# Patient Record
Sex: MALE | Race: WHITE | NOT HISPANIC OR LATINO | Employment: FULL TIME | URBAN - NONMETROPOLITAN AREA
[De-identification: names, ages, dates, MRNs, and addresses within clinical notes are randomized per-mention and may not be internally consistent; named-entity substitution may affect disease eponyms.]

---

## 2021-01-01 ENCOUNTER — HOSPITAL ENCOUNTER (EMERGENCY)
Facility: HOSPITAL | Age: 41
Discharge: LEFT BEFORE DISCHARGE | End: 2021-01-01
Attending: EMERGENCY MEDICINE | Admitting: EMERGENCY MEDICINE

## 2021-01-01 VITALS
BODY MASS INDEX: 23.62 KG/M2 | OXYGEN SATURATION: 97 % | TEMPERATURE: 98.5 F | DIASTOLIC BLOOD PRESSURE: 66 MMHG | HEART RATE: 95 BPM | SYSTOLIC BLOOD PRESSURE: 148 MMHG | HEIGHT: 70 IN | WEIGHT: 165 LBS | RESPIRATION RATE: 18 BRPM

## 2021-01-01 DIAGNOSIS — F11.93 HEROIN WITHDRAWAL (HCC): Primary | ICD-10-CM

## 2021-01-01 LAB
ALBUMIN SERPL-MCNC: 4.55 G/DL (ref 3.5–5.2)
ALBUMIN/GLOB SERPL: 1.2 G/DL
ALP SERPL-CCNC: 85 U/L (ref 39–117)
ALT SERPL W P-5'-P-CCNC: 35 U/L (ref 1–41)
ANION GAP SERPL CALCULATED.3IONS-SCNC: 11 MMOL/L (ref 5–15)
AST SERPL-CCNC: 41 U/L (ref 1–40)
BASOPHILS # BLD AUTO: 0.1 10*3/MM3 (ref 0–0.2)
BASOPHILS NFR BLD AUTO: 0.6 % (ref 0–1.5)
BILIRUB SERPL-MCNC: 0.4 MG/DL (ref 0–1.2)
BUN SERPL-MCNC: 14 MG/DL (ref 6–20)
BUN/CREAT SERPL: 16.1 (ref 7–25)
CALCIUM SPEC-SCNC: 9.7 MG/DL (ref 8.6–10.5)
CHLORIDE SERPL-SCNC: 104 MMOL/L (ref 98–107)
CO2 SERPL-SCNC: 25 MMOL/L (ref 22–29)
CREAT SERPL-MCNC: 0.87 MG/DL (ref 0.76–1.27)
DEPRECATED RDW RBC AUTO: 42.8 FL (ref 37–54)
EOSINOPHIL # BLD AUTO: 0.27 10*3/MM3 (ref 0–0.4)
EOSINOPHIL NFR BLD AUTO: 1.5 % (ref 0.3–6.2)
ERYTHROCYTE [DISTWIDTH] IN BLOOD BY AUTOMATED COUNT: 12.4 % (ref 12.3–15.4)
ETHANOL BLD-MCNC: <10 MG/DL (ref 0–10)
ETHANOL UR QL: <0.01 %
GFR SERPL CREATININE-BSD FRML MDRD: 98 ML/MIN/1.73
GLOBULIN UR ELPH-MCNC: 3.9 GM/DL
GLUCOSE SERPL-MCNC: 123 MG/DL (ref 65–99)
HCT VFR BLD AUTO: 52.8 % (ref 37.5–51)
HGB BLD-MCNC: 17 G/DL (ref 13–17.7)
IMM GRANULOCYTES # BLD AUTO: 0.06 10*3/MM3 (ref 0–0.05)
IMM GRANULOCYTES NFR BLD AUTO: 0.3 % (ref 0–0.5)
LYMPHOCYTES # BLD AUTO: 1.92 10*3/MM3 (ref 0.7–3.1)
LYMPHOCYTES NFR BLD AUTO: 10.9 % (ref 19.6–45.3)
MAGNESIUM SERPL-MCNC: 2 MG/DL (ref 1.6–2.6)
MCH RBC QN AUTO: 30.2 PG (ref 26.6–33)
MCHC RBC AUTO-ENTMCNC: 32.2 G/DL (ref 31.5–35.7)
MCV RBC AUTO: 93.8 FL (ref 79–97)
MONOCYTES # BLD AUTO: 0.73 10*3/MM3 (ref 0.1–0.9)
MONOCYTES NFR BLD AUTO: 4.1 % (ref 5–12)
NEUTROPHILS NFR BLD AUTO: 14.57 10*3/MM3 (ref 1.7–7)
NEUTROPHILS NFR BLD AUTO: 82.6 % (ref 42.7–76)
NRBC BLD AUTO-RTO: 0 /100 WBC (ref 0–0.2)
PLATELET # BLD AUTO: 276 10*3/MM3 (ref 140–450)
PMV BLD AUTO: 9.4 FL (ref 6–12)
POTASSIUM SERPL-SCNC: 4 MMOL/L (ref 3.5–5.2)
PROT SERPL-MCNC: 8.4 G/DL (ref 6–8.5)
RBC # BLD AUTO: 5.63 10*6/MM3 (ref 4.14–5.8)
SODIUM SERPL-SCNC: 140 MMOL/L (ref 136–145)
WBC # BLD AUTO: 17.65 10*3/MM3 (ref 3.4–10.8)

## 2021-01-01 PROCEDURE — 83735 ASSAY OF MAGNESIUM: CPT | Performed by: PHYSICIAN ASSISTANT

## 2021-01-01 PROCEDURE — 99284 EMERGENCY DEPT VISIT MOD MDM: CPT

## 2021-01-01 PROCEDURE — 82077 ASSAY SPEC XCP UR&BREATH IA: CPT | Performed by: PHYSICIAN ASSISTANT

## 2021-01-01 PROCEDURE — 85025 COMPLETE CBC W/AUTO DIFF WBC: CPT | Performed by: PHYSICIAN ASSISTANT

## 2021-01-01 PROCEDURE — 80053 COMPREHEN METABOLIC PANEL: CPT | Performed by: PHYSICIAN ASSISTANT

## 2021-01-01 NOTE — DISCHARGE INSTRUCTIONS
Call one of the offices below to establish a primary care provider.  If you are unable to get an appointment and feel it is an emergency and need to be seen immediately please return to the Emergency Department.    Call one of the office below to set up a primary care provider.    Dr. Yomi Kc                                                                                                       [address] [facility] [address]  [phone]    Dr. Stephens, Dr. JUNE Batista, Dr. GREY Batista ([facility])  [address] [facility] [address]  [phone]    Dr. Worrell, Dr. Mena, Dr. Wilkinson ([facility])  [address] [facility] [address]  [phone]    Dr. Michel  [address] [facility] [address]  [phone]    Dr. Jean, Dr. Rockwell, Dr. Currie, Dr. Reyes ([facility])  [address] [place] [address] [address]  [facility] [address]  [phone]    Dr. Graciela Burnett  [address] [facility] [place] [address]  [phone]    Dr. Radha Robertson  [address]   [address]  [facility] [address]  [phone]    Dr. Kc  [address] [facility] [address]  [phone]    Dr. Parra, Dr. Don  [address] [facility] [place] [address]  [phone]    Dr. Cortes  [address]                                                              [address]  [facility] [address]  [phone]    Dr. Lacy  [address] [facility] [address]  [phone]    Dr. Kerry Willis  [address]  [address]  [place] [address]  [phone]    Dr. Fernandez and [facility]   [address] [facility]  [address]  [place] [address]  [phone]

## 2021-01-01 NOTE — NURSING NOTE
Pt refusing to give urine sample and COVID test. Pt offered hydration and placed in waiting area. Will continue to monitor pt. MD made aware.

## 2021-01-01 NOTE — NURSING NOTE
Patient arrived to intake, searched and placed in scrubs, witnessed by two staff members per protocol. All belongings collected and logged. Patient placed in safe room within view. Will continue to monitor

## 2021-01-01 NOTE — ED PROVIDER NOTES
Subjective   40-year-old male who presents to the ED today for detox evaluation.  He states he needs detox from heroin.  He states his last use was yesterday morning.  He denies any other drug use.  He denies any alcohol use.  He denies any suicidal ideations.  He states currently he is having chills and sweats and feels very anxious.      History provided by:  Patient  Drug / Alcohol Assessment  Primary symptoms comment: requesting detox. This is a new problem. The current episode started 12 to 24 hours ago. The problem has been gradually worsening. Suspected agents include heroin. Pertinent negatives include no fever, no nausea and no vomiting. Associated medical issues include addiction treatment.       Review of Systems   Constitutional: Positive for chills and diaphoresis. Negative for fever.   HENT: Negative.    Eyes: Negative.    Respiratory: Negative.    Cardiovascular: Negative.    Gastrointestinal: Negative for nausea and vomiting.   Genitourinary: Negative.    Musculoskeletal: Negative.    Skin: Negative.    Neurological: Negative.    Psychiatric/Behavioral: Negative for suicidal ideas. The patient is nervous/anxious.    All other systems reviewed and are negative.      No past medical history on file.    No Known Allergies    No past surgical history on file.    No family history on file.    Social History     Socioeconomic History   • Marital status:      Spouse name: Not on file   • Number of children: Not on file   • Years of education: Not on file   • Highest education level: Not on file           Objective   Physical Exam  Vitals signs and nursing note reviewed.   Constitutional:       General: He is not in acute distress.     Appearance: Normal appearance.   HENT:      Head: Normocephalic and atraumatic.      Right Ear: External ear normal.      Left Ear: External ear normal.   Eyes:      Conjunctiva/sclera: Conjunctivae normal.      Pupils: Pupils are equal, round, and reactive to light.    Neck:      Musculoskeletal: Normal range of motion and neck supple.   Cardiovascular:      Rate and Rhythm: Normal rate and regular rhythm.      Pulses: Normal pulses.      Heart sounds: Normal heart sounds.   Pulmonary:      Effort: Pulmonary effort is normal.      Breath sounds: Normal breath sounds.   Abdominal:      General: Bowel sounds are normal.      Palpations: Abdomen is soft.   Musculoskeletal: Normal range of motion.   Skin:     General: Skin is warm and dry.      Capillary Refill: Capillary refill takes less than 2 seconds.   Neurological:      General: No focal deficit present.      Mental Status: He is alert and oriented to person, place, and time.   Psychiatric:         Mood and Affect: Mood is anxious.         Thought Content: Thought content does not include suicidal ideation.         Procedures           ED Course  ED Course as of Jan 01 1412 Fri Jan 01, 2021   1403 Patient has decided that he does not want to stay for detox.  He will sign out AMA.    [AH]      ED Course User Index  [AH] Zainab Mixon PA                                           MDM  Number of Diagnoses or Management Options  Heroin withdrawal (CMS/HCC):      Amount and/or Complexity of Data Reviewed  Clinical lab tests: reviewed    Patient Progress  Patient progress: stable      Final diagnoses:   Heroin withdrawal (CMS/HCC)            Zainab Mixon PA  01/01/21 1412

## 2021-01-01 NOTE — NURSING NOTE
Pt states that his wife forced him to come here for help and he doesn't need it. Pt states that he feels safe at home and doesn't have a real problem. Pt states that he wishes to discharge AMA.

## 2021-01-01 NOTE — NURSING NOTE
Pt requesting discharge. Pt informed of the benefits of receiving treatment and the risks of discharging against medical advice. Pt insists that he understands and still wants to leave. MD gave order to discharge AMA.

## 2021-03-17 ENCOUNTER — HOSPITAL ENCOUNTER (EMERGENCY)
Facility: HOSPITAL | Age: 41
Discharge: PSYCHIATRIC OR SUBSTANCE USE FACILITY | End: 2021-03-17
Attending: EMERGENCY MEDICINE | Admitting: EMERGENCY MEDICINE

## 2021-03-17 ENCOUNTER — HOSPITAL ENCOUNTER (INPATIENT)
Facility: HOSPITAL | Age: 41
LOS: 1 days | Discharge: LEFT BEFORE DISCHARGE | End: 2021-03-18
Attending: PSYCHIATRY & NEUROLOGY | Admitting: PSYCHIATRY & NEUROLOGY

## 2021-03-17 VITALS
RESPIRATION RATE: 18 BRPM | HEIGHT: 70 IN | BODY MASS INDEX: 22.19 KG/M2 | WEIGHT: 155 LBS | OXYGEN SATURATION: 99 % | SYSTOLIC BLOOD PRESSURE: 142 MMHG | HEART RATE: 92 BPM | DIASTOLIC BLOOD PRESSURE: 72 MMHG | TEMPERATURE: 98.4 F

## 2021-03-17 DIAGNOSIS — F11.93 HEROIN WITHDRAWAL (HCC): Primary | ICD-10-CM

## 2021-03-17 DIAGNOSIS — F19.10 SUBSTANCE ABUSE (HCC): ICD-10-CM

## 2021-03-17 PROBLEM — F11.20 OPIATE DEPENDENCE (HCC): Status: ACTIVE | Noted: 2021-03-17

## 2021-03-17 LAB
6-ACETYL MORPHINE: NEGATIVE
ALBUMIN SERPL-MCNC: 4.14 G/DL (ref 3.5–5.2)
ALBUMIN/GLOB SERPL: 1.2 G/DL
ALP SERPL-CCNC: 262 U/L (ref 39–117)
ALT SERPL W P-5'-P-CCNC: 93 U/L (ref 1–41)
AMPHET+METHAMPHET UR QL: NEGATIVE
ANION GAP SERPL CALCULATED.3IONS-SCNC: 12.3 MMOL/L (ref 5–15)
AST SERPL-CCNC: 58 U/L (ref 1–40)
BARBITURATES UR QL SCN: NEGATIVE
BASOPHILS # BLD AUTO: 0.11 10*3/MM3 (ref 0–0.2)
BASOPHILS NFR BLD AUTO: 1.3 % (ref 0–1.5)
BENZODIAZ UR QL SCN: POSITIVE
BILIRUB SERPL-MCNC: 0.4 MG/DL (ref 0–1.2)
BILIRUB UR QL STRIP: NEGATIVE
BUN SERPL-MCNC: 13 MG/DL (ref 6–20)
BUN/CREAT SERPL: 13.5 (ref 7–25)
BUPRENORPHINE SERPL-MCNC: POSITIVE NG/ML
CALCIUM SPEC-SCNC: 9.3 MG/DL (ref 8.6–10.5)
CANNABINOIDS SERPL QL: NEGATIVE
CHLORIDE SERPL-SCNC: 103 MMOL/L (ref 98–107)
CLARITY UR: ABNORMAL
CO2 SERPL-SCNC: 27.7 MMOL/L (ref 22–29)
COCAINE UR QL: NEGATIVE
COLOR UR: YELLOW
CREAT SERPL-MCNC: 0.96 MG/DL (ref 0.76–1.27)
DEPRECATED RDW RBC AUTO: 42.8 FL (ref 37–54)
EOSINOPHIL # BLD AUTO: 0.71 10*3/MM3 (ref 0–0.4)
EOSINOPHIL NFR BLD AUTO: 8.3 % (ref 0.3–6.2)
ERYTHROCYTE [DISTWIDTH] IN BLOOD BY AUTOMATED COUNT: 12.4 % (ref 12.3–15.4)
ETHANOL BLD-MCNC: <10 MG/DL (ref 0–10)
ETHANOL UR QL: <0.01 %
FLUAV RNA RESP QL NAA+PROBE: NOT DETECTED
FLUBV RNA RESP QL NAA+PROBE: NOT DETECTED
GFR SERPL CREATININE-BSD FRML MDRD: 88 ML/MIN/1.73
GLOBULIN UR ELPH-MCNC: 3.4 GM/DL
GLUCOSE SERPL-MCNC: 125 MG/DL (ref 65–99)
GLUCOSE UR STRIP-MCNC: NEGATIVE MG/DL
HCT VFR BLD AUTO: 49.1 % (ref 37.5–51)
HGB BLD-MCNC: 15.8 G/DL (ref 13–17.7)
HGB UR QL STRIP.AUTO: NEGATIVE
HOLD SPECIMEN: NORMAL
HOLD SPECIMEN: NORMAL
IMM GRANULOCYTES # BLD AUTO: 0.03 10*3/MM3 (ref 0–0.05)
IMM GRANULOCYTES NFR BLD AUTO: 0.4 % (ref 0–0.5)
KETONES UR QL STRIP: NEGATIVE
LEUKOCYTE ESTERASE UR QL STRIP.AUTO: NEGATIVE
LYMPHOCYTES # BLD AUTO: 1.48 10*3/MM3 (ref 0.7–3.1)
LYMPHOCYTES NFR BLD AUTO: 17.4 % (ref 19.6–45.3)
MAGNESIUM SERPL-MCNC: 2.2 MG/DL (ref 1.6–2.6)
MCH RBC QN AUTO: 29.9 PG (ref 26.6–33)
MCHC RBC AUTO-ENTMCNC: 32.2 G/DL (ref 31.5–35.7)
MCV RBC AUTO: 93 FL (ref 79–97)
METHADONE UR QL SCN: NEGATIVE
MONOCYTES # BLD AUTO: 0.56 10*3/MM3 (ref 0.1–0.9)
MONOCYTES NFR BLD AUTO: 6.6 % (ref 5–12)
NEUTROPHILS NFR BLD AUTO: 5.63 10*3/MM3 (ref 1.7–7)
NEUTROPHILS NFR BLD AUTO: 66 % (ref 42.7–76)
NITRITE UR QL STRIP: NEGATIVE
NRBC BLD AUTO-RTO: 0 /100 WBC (ref 0–0.2)
OPIATES UR QL: POSITIVE
OXYCODONE UR QL SCN: NEGATIVE
PCP UR QL SCN: NEGATIVE
PH UR STRIP.AUTO: >=9 [PH] (ref 5–8)
PLATELET # BLD AUTO: 374 10*3/MM3 (ref 140–450)
PMV BLD AUTO: 8.9 FL (ref 6–12)
POTASSIUM SERPL-SCNC: 4.4 MMOL/L (ref 3.5–5.2)
PROT SERPL-MCNC: 7.5 G/DL (ref 6–8.5)
PROT UR QL STRIP: NEGATIVE
RBC # BLD AUTO: 5.28 10*6/MM3 (ref 4.14–5.8)
SARS-COV-2 RNA RESP QL NAA+PROBE: NOT DETECTED
SODIUM SERPL-SCNC: 143 MMOL/L (ref 136–145)
SP GR UR STRIP: 1.01 (ref 1–1.03)
UROBILINOGEN UR QL STRIP: ABNORMAL
WBC # BLD AUTO: 8.52 10*3/MM3 (ref 3.4–10.8)
WHOLE BLOOD HOLD SPECIMEN: NORMAL
WHOLE BLOOD HOLD SPECIMEN: NORMAL

## 2021-03-17 PROCEDURE — 87636 SARSCOV2 & INF A&B AMP PRB: CPT | Performed by: PHYSICIAN ASSISTANT

## 2021-03-17 PROCEDURE — 80307 DRUG TEST PRSMV CHEM ANLYZR: CPT | Performed by: PHYSICIAN ASSISTANT

## 2021-03-17 PROCEDURE — 83735 ASSAY OF MAGNESIUM: CPT | Performed by: PHYSICIAN ASSISTANT

## 2021-03-17 PROCEDURE — 81003 URINALYSIS AUTO W/O SCOPE: CPT | Performed by: PHYSICIAN ASSISTANT

## 2021-03-17 PROCEDURE — 85025 COMPLETE CBC W/AUTO DIFF WBC: CPT | Performed by: PHYSICIAN ASSISTANT

## 2021-03-17 PROCEDURE — 93010 ELECTROCARDIOGRAM REPORT: CPT | Performed by: INTERNAL MEDICINE

## 2021-03-17 PROCEDURE — 82077 ASSAY SPEC XCP UR&BREATH IA: CPT | Performed by: PHYSICIAN ASSISTANT

## 2021-03-17 PROCEDURE — 93005 ELECTROCARDIOGRAM TRACING: CPT | Performed by: PSYCHIATRY & NEUROLOGY

## 2021-03-17 PROCEDURE — 80053 COMPREHEN METABOLIC PANEL: CPT | Performed by: PHYSICIAN ASSISTANT

## 2021-03-17 PROCEDURE — 63710000001 ONDANSETRON PER 8 MG: Performed by: PSYCHIATRY & NEUROLOGY

## 2021-03-17 PROCEDURE — 99285 EMERGENCY DEPT VISIT HI MDM: CPT

## 2021-03-17 RX ORDER — CLONIDINE HYDROCHLORIDE 0.1 MG/1
0.1 TABLET ORAL 4 TIMES DAILY PRN
Status: ACTIVE | OUTPATIENT
Start: 2021-03-17 | End: 2021-03-18

## 2021-03-17 RX ORDER — BENZONATATE 100 MG/1
100 CAPSULE ORAL 3 TIMES DAILY PRN
Status: DISCONTINUED | OUTPATIENT
Start: 2021-03-17 | End: 2021-03-18 | Stop reason: HOSPADM

## 2021-03-17 RX ORDER — BENZTROPINE MESYLATE 1 MG/1
2 TABLET ORAL ONCE AS NEEDED
Status: DISCONTINUED | OUTPATIENT
Start: 2021-03-17 | End: 2021-03-18 | Stop reason: HOSPADM

## 2021-03-17 RX ORDER — BENZTROPINE MESYLATE 1 MG/ML
1 INJECTION INTRAMUSCULAR; INTRAVENOUS ONCE AS NEEDED
Status: DISCONTINUED | OUTPATIENT
Start: 2021-03-17 | End: 2021-03-18 | Stop reason: HOSPADM

## 2021-03-17 RX ORDER — ONDANSETRON 4 MG/1
4 TABLET, FILM COATED ORAL EVERY 6 HOURS PRN
Status: DISCONTINUED | OUTPATIENT
Start: 2021-03-17 | End: 2021-03-18 | Stop reason: HOSPADM

## 2021-03-17 RX ORDER — CLONIDINE HYDROCHLORIDE 0.1 MG/1
0.1 TABLET ORAL 4 TIMES DAILY PRN
Status: DISCONTINUED | OUTPATIENT
Start: 2021-03-18 | End: 2021-03-18 | Stop reason: HOSPADM

## 2021-03-17 RX ORDER — ECHINACEA PURPUREA EXTRACT 125 MG
2 TABLET ORAL AS NEEDED
Status: DISCONTINUED | OUTPATIENT
Start: 2021-03-17 | End: 2021-03-18 | Stop reason: HOSPADM

## 2021-03-17 RX ORDER — CYCLOBENZAPRINE HCL 10 MG
10 TABLET ORAL 3 TIMES DAILY PRN
Status: DISCONTINUED | OUTPATIENT
Start: 2021-03-17 | End: 2021-03-18 | Stop reason: HOSPADM

## 2021-03-17 RX ORDER — DICYCLOMINE HYDROCHLORIDE 10 MG/1
10 CAPSULE ORAL 3 TIMES DAILY PRN
Status: DISCONTINUED | OUTPATIENT
Start: 2021-03-17 | End: 2021-03-18 | Stop reason: HOSPADM

## 2021-03-17 RX ORDER — LOPERAMIDE HYDROCHLORIDE 2 MG/1
2 CAPSULE ORAL
Status: DISCONTINUED | OUTPATIENT
Start: 2021-03-17 | End: 2021-03-18 | Stop reason: HOSPADM

## 2021-03-17 RX ORDER — LEVOTHYROXINE SODIUM 0.07 MG/1
112 TABLET ORAL DAILY
Status: DISCONTINUED | OUTPATIENT
Start: 2021-03-17 | End: 2021-03-18 | Stop reason: HOSPADM

## 2021-03-17 RX ORDER — ALUMINA, MAGNESIA, AND SIMETHICONE 2400; 2400; 240 MG/30ML; MG/30ML; MG/30ML
15 SUSPENSION ORAL EVERY 6 HOURS PRN
Status: DISCONTINUED | OUTPATIENT
Start: 2021-03-17 | End: 2021-03-18 | Stop reason: HOSPADM

## 2021-03-17 RX ORDER — LEVOTHYROXINE SODIUM 0.07 MG/1
112 TABLET ORAL DAILY
Status: CANCELLED | OUTPATIENT
Start: 2021-03-18

## 2021-03-17 RX ORDER — FAMOTIDINE 20 MG/1
20 TABLET, FILM COATED ORAL 2 TIMES DAILY PRN
Status: DISCONTINUED | OUTPATIENT
Start: 2021-03-17 | End: 2021-03-18 | Stop reason: HOSPADM

## 2021-03-17 RX ORDER — TRAZODONE HYDROCHLORIDE 50 MG/1
50 TABLET ORAL NIGHTLY PRN
Status: DISCONTINUED | OUTPATIENT
Start: 2021-03-17 | End: 2021-03-18 | Stop reason: HOSPADM

## 2021-03-17 RX ORDER — CLONIDINE HYDROCHLORIDE 0.1 MG/1
0.1 TABLET ORAL 2 TIMES DAILY PRN
Status: DISCONTINUED | OUTPATIENT
Start: 2021-03-20 | End: 2021-03-18 | Stop reason: HOSPADM

## 2021-03-17 RX ORDER — HYDROXYZINE 50 MG/1
50 TABLET, FILM COATED ORAL EVERY 6 HOURS PRN
Status: DISCONTINUED | OUTPATIENT
Start: 2021-03-17 | End: 2021-03-18 | Stop reason: HOSPADM

## 2021-03-17 RX ORDER — ACETAMINOPHEN 325 MG/1
650 TABLET ORAL EVERY 6 HOURS PRN
Status: DISCONTINUED | OUTPATIENT
Start: 2021-03-17 | End: 2021-03-17

## 2021-03-17 RX ORDER — IBUPROFEN 400 MG/1
400 TABLET ORAL EVERY 6 HOURS PRN
Status: DISCONTINUED | OUTPATIENT
Start: 2021-03-17 | End: 2021-03-18 | Stop reason: HOSPADM

## 2021-03-17 RX ORDER — CLONIDINE HYDROCHLORIDE 0.1 MG/1
0.1 TABLET ORAL DAILY PRN
Status: DISCONTINUED | OUTPATIENT
Start: 2021-03-21 | End: 2021-03-18 | Stop reason: HOSPADM

## 2021-03-17 RX ORDER — LEVOTHYROXINE SODIUM 112 UG/1
112 TABLET ORAL DAILY
COMMUNITY

## 2021-03-17 RX ORDER — CLONIDINE HYDROCHLORIDE 0.1 MG/1
0.1 TABLET ORAL 3 TIMES DAILY PRN
Status: DISCONTINUED | OUTPATIENT
Start: 2021-03-19 | End: 2021-03-18 | Stop reason: HOSPADM

## 2021-03-17 RX ADMIN — HYDROXYZINE HYDROCHLORIDE 50 MG: 50 TABLET ORAL at 21:22

## 2021-03-17 RX ADMIN — ONDANSETRON HYDROCHLORIDE 4 MG: 4 TABLET, FILM COATED ORAL at 14:11

## 2021-03-17 RX ADMIN — ONDANSETRON HYDROCHLORIDE 4 MG: 4 TABLET, FILM COATED ORAL at 21:23

## 2021-03-17 RX ADMIN — HYDROXYZINE HYDROCHLORIDE 50 MG: 50 TABLET ORAL at 14:11

## 2021-03-17 RX ADMIN — IBUPROFEN 400 MG: 400 TABLET, FILM COATED ORAL at 21:23

## 2021-03-17 RX ADMIN — TRAZODONE HYDROCHLORIDE 50 MG: 50 TABLET ORAL at 21:23

## 2021-03-17 NOTE — NURSING NOTE
Intake assessment completed. Pt self presented to our ER requesting help with addiction problem. He reports that he began opiates around 3 years ago, for the past 2 he has been smoking heroin daily. Currently smoking 2 grams or an 8 ball daily. He last used on 3/16/21. Current withdrawal complaints nausea, chills, HA, runny nose, body aches. COWS 20. Denies previous substance abuse treatments. He reports poor sleep and appetite beginning with withdrawal last night. He denies si, hi, or AVH. Not delusional. Rates depression 4/10 and anxiety 8/10 with 10 being the worst.

## 2021-03-17 NOTE — NURSING NOTE
Patient presents requesting help for addiction problem. He reports that he began opiates around 3 years ago, for the past 2 he has been smoking heroin daily. Currently smoking 2 grams or an 8 ball daily. He last used on 3/16/21. Current withdrawal complaints sweating, nausea, chills, HA, runny nose, body aches, unable to sit still. COWS 23. Denies previous substance abuse treatments. He reports poor sleep and appetite beginning with withdrawal last night. Denies SI/HI/AVH. Reports a craving level of 10. During assessment patient was increasingly frustrated and mostly focused on getting more medication.

## 2021-03-17 NOTE — NURSING NOTE
Presented clinicals to Dr Arreguin. New orders to admit to CD. Begin clonidine detox protocol. RBTOx2

## 2021-03-17 NOTE — PLAN OF CARE
Problem: Adult Behavioral Health Plan of Care  Goal: Plan of Care Review  Outcome: Ongoing, Progressing  Flowsheets (Taken 3/17/2021 3585)  Progress: no change  Plan of Care Reviewed With: patient  Patient Agreement with Plan of Care: agrees  Outcome Summary: new admission   Goal Outcome Evaluation:  Plan of Care Reviewed With: patient  Progress: no change  Outcome Summary: new admission

## 2021-03-17 NOTE — PLAN OF CARE
Problem: Adult Behavioral Health Plan of Care  Goal: Plan of Care Review  Outcome: Ongoing, Progressing  Flowsheets (Taken 3/17/2021 1722)  Plan of Care Reviewed With: patient  Patient Agreement with Plan of Care: agrees  Outcome Summary: new admit, client irritable but cooperative   Goal Outcome Evaluation:  Plan of Care Reviewed With: patient     Outcome Summary: new admit, client irritable but cooperative

## 2021-03-17 NOTE — ED PROVIDER NOTES
Subjective   40-year-old male who presents to the ED today with his wife for a detox evaluation.  He states he needs detox from heroin.  He states his last use was yesterday morning.  He states currently he is having sweats and feels very anxious.  He states sometimes he breaks out in chill bumps.  He states he also feels depressed because of the impact his drug use has had on his family.  He denies any alcohol use.  He denies any suicidal ideations.  He states he only takes thyroid medication at home.      History provided by:  Patient  Drug / Alcohol Assessment  Primary symptoms comment: requesting detox. This is a new problem. The current episode started 12 to 24 hours ago. The problem has been gradually worsening. Suspected agents include heroin. Pertinent negatives include no nausea and no vomiting. Associated medical issues include addiction treatment.       Review of Systems   Constitutional: Positive for chills and diaphoresis.   HENT: Negative.    Eyes: Negative.    Respiratory: Negative.    Cardiovascular: Negative.    Gastrointestinal: Negative for nausea and vomiting.   Genitourinary: Negative.    Musculoskeletal: Negative.    Skin: Negative.    Neurological: Negative.    Psychiatric/Behavioral: Positive for dysphoric mood. Negative for suicidal ideas. The patient is nervous/anxious.    All other systems reviewed and are negative.      Past Medical History:   Diagnosis Date   • Hyperthyroidism    • Kidney stones        No Known Allergies    Past Surgical History:   Procedure Laterality Date   • APPENDECTOMY     • KIDNEY STONE SURGERY         Family History   Problem Relation Age of Onset   • Drug abuse Brother        Social History     Socioeconomic History   • Marital status:      Spouse name: Not on file   • Number of children: Not on file   • Years of education: Not on file   • Highest education level: Not on file   Tobacco Use   • Smoking status: Current Every Day Smoker     Packs/day: 1.00      Types: Cigarettes   Substance and Sexual Activity   • Alcohol use: Not Currently   • Drug use: Yes     Types: Heroin   • Sexual activity: Yes     Partners: Female           Objective   Physical Exam  Vitals and nursing note reviewed.   Constitutional:       General: He is not in acute distress.     Appearance: Normal appearance.   HENT:      Head: Normocephalic and atraumatic.      Right Ear: External ear normal.      Left Ear: External ear normal.   Eyes:      Conjunctiva/sclera: Conjunctivae normal.      Pupils: Pupils are equal, round, and reactive to light.   Cardiovascular:      Rate and Rhythm: Normal rate and regular rhythm.      Pulses: Normal pulses.      Heart sounds: Normal heart sounds.   Pulmonary:      Effort: Pulmonary effort is normal.      Breath sounds: Normal breath sounds.   Abdominal:      General: Bowel sounds are normal.      Palpations: Abdomen is soft.      Tenderness: There is no abdominal tenderness.   Musculoskeletal:         General: Normal range of motion.      Cervical back: Normal range of motion and neck supple.   Skin:     General: Skin is warm and dry.      Capillary Refill: Capillary refill takes less than 2 seconds.   Neurological:      General: No focal deficit present.      Mental Status: He is alert and oriented to person, place, and time.   Psychiatric:         Mood and Affect: Mood is depressed. Affect is tearful.         Speech: Speech normal.         Behavior: Behavior normal. Behavior is cooperative.         Thought Content: Thought content does not include homicidal or suicidal ideation.         Procedures           ED Course  ED Course as of Mar 17 1143   Wed Mar 17, 2021   1137 COVID19: Not Detected [AH]   1137 Medically clear for detox    []      ED Course User Index  [] Zainab Mixon, PA                                           MDM  Number of Diagnoses or Management Options     Amount and/or Complexity of Data Reviewed  Clinical lab tests: reviewed    Patient  Progress  Patient progress: stable      Final diagnoses:   Heroin withdrawal (CMS/[facility])   Substance abuse (CMS/[facility])       ED Disposition  ED Disposition     ED Disposition Condition Comment    Discharge to Behavioral Health Stable           No follow-up provider specified.       Medication List      No changes were made to your prescriptions during this visit.          Zainab Mixon PA  03/17/21 1143

## 2021-03-18 VITALS
RESPIRATION RATE: 18 BRPM | TEMPERATURE: 98.1 F | HEART RATE: 86 BPM | BODY MASS INDEX: 22.19 KG/M2 | SYSTOLIC BLOOD PRESSURE: 139 MMHG | HEIGHT: 70 IN | OXYGEN SATURATION: 97 % | DIASTOLIC BLOOD PRESSURE: 73 MMHG | WEIGHT: 155 LBS

## 2021-03-18 PROCEDURE — 63710000001 ONDANSETRON PER 8 MG: Performed by: PSYCHIATRY & NEUROLOGY

## 2021-03-18 PROCEDURE — 99235 HOSP IP/OBS SAME DATE MOD 70: CPT | Performed by: PSYCHIATRY & NEUROLOGY

## 2021-03-18 RX ADMIN — ONDANSETRON HYDROCHLORIDE 4 MG: 4 TABLET, FILM COATED ORAL at 08:41

## 2021-03-18 RX ADMIN — HYDROXYZINE HYDROCHLORIDE 50 MG: 50 TABLET ORAL at 08:41

## 2021-03-18 RX ADMIN — DICYCLOMINE HYDROCHLORIDE 10 MG: 10 CAPSULE ORAL at 08:41

## 2021-03-18 RX ADMIN — CYCLOBENZAPRINE HYDROCHLORIDE 10 MG: 10 TABLET, FILM COATED ORAL at 08:41

## 2021-03-18 RX ADMIN — IBUPROFEN 400 MG: 400 TABLET, FILM COATED ORAL at 08:41

## 2021-03-18 RX ADMIN — CLONIDINE HYDROCHLORIDE 0.1 MG: 0.1 TABLET ORAL at 08:41

## 2021-03-18 RX ADMIN — LEVOTHYROXINE SODIUM 112 MCG: 0.07 TABLET ORAL at 08:41

## 2021-03-18 NOTE — PLAN OF CARE
Goal Outcome Evaluation:  Plan of Care Reviewed With: patient  Progress: no change  Outcome Summary: Completed social history. Reviewed treatment plans. Patient agreeable.      Problem: Adult Behavioral Health Plan of Care  Goal: Plan of Care Review  Outcome: Ongoing, Progressing  Flowsheets (Taken 3/18/2021 0949)  Consent Given to Review Plan with: Refused  Progress: no change  Plan of Care Reviewed With: patient  Patient Agreement with Plan of Care: agrees  Outcome Summary: Completed social history. Reviewed treatment plans. Patient agreeable.     Problem: Adult Behavioral Health Plan of Care  Goal: Patient-Specific Goal (Individualization)  Outcome: Ongoing, Progressing  Flowsheets  Taken 3/18/2021 0949  Patient Personal Strengths:   resilient   resourceful   relationship stability   stable living environment   spiritual/Rastafarian support   socioeconomic stability   family/social support   positive vocational history   positive educational history  Patient-Specific Goals (Include Timeframe): Identify 1-2 sources of motivation for recovery.  Anxieties, Fears or Concerns: Believes he could have done this at home.  Patient Vulnerabilities:   substance abuse/addiction   lacks insight into illness  Taken 3/18/2021 0926  Patient Personal Strengths: (I've got a good wife. Held my job down for 18 years.) other (see comments)  Patient Vulnerabilities: (I guess drugs right now. But they're behind me now man.) other (see comments)     Problem: Adult Behavioral Health Plan of Care  Goal: Optimized Coping Skills in Response to Life Stressors  Intervention: Promote Effective Coping Strategies  Flowsheets (Taken 3/18/2021 0949)  Supportive Measures:   active listening utilized   positive reinforcement provided   self-responsibility promoted   verbalization of feelings encouraged   problem-solving facilitated   counseling provided   self-reflection promoted     Problem: Adult Behavioral Health Plan of Care  Goal:  Develops/Participates in Therapeutic [place] to Support Successful Transition  Intervention: Foster Therapeutic [place]  Flowsheets (Taken 3/18/2021 0949)  Trust Relationship/Rapport:   care explained   reassurance provided   thoughts/feelings acknowledged   choices provided   emotional support provided   empathic listening provided   questions answered   questions encouraged     Problem: Adult Behavioral Health Plan of Care  Goal: Develops/Participates in Therapeutic [place] to Support Successful Transition  Intervention: Mutually Develop Transition Plan  Flowsheets (Taken 3/18/2021 0949)  Outpatient/Agency/Support Group Needs:   12 step program (specify)   support group(s) (specify)  Discharge Coordination/Progress: Patient planned only to follow up with NA meetings with his wife.  Transition Support:   community resources reviewed   follow-up care discussed  Transportation Anticipated: family or friend will provide  Anticipated Discharge Disposition: home with family  Transportation Concerns: car, none  Current Discharge Risk: substance use/abuse  Concerns to be Addressed:   mental health   discharge planning   substance/tobacco abuse/use   coping/stress  Readmission Within the Last 30 Days: no previous admission in last 30 days  Patient/Family Anticipated Services at Transition: (refused) none  Patient's Choice of Community Agency(s): Patient refused  Patient/Family Anticipates Transition to: home with family  Offered/Gave Vendor List: yes       [phone]  D: Patient is a 40-year-old  male currently residing in [facility] where he has been living with his wife of 15 years.  They have lived in their own home for 10 years now.  The patient has a high school education and some college education.  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  The patient identified himself as an addict.  He reported his drug of choice was heroin, and he  "explained that he did not  heroin until 2 years ago.  He was frustrated with himself for having started drug use in the first place, because he had watched his 2 brothers struggle with addiction for years.  He seemed to feel like he should have known better.  The patient also lost his father to alcohol misuse 20 years ago.  The patient reported no history of detoxification treatment.  He has no history of mental health treatment either.  His plan upon discharge was to follow-up with Narcotics Anonymous.  He declined all other forms of aftercare.    A: Patient's affect appeared depressed. He was tearful throughout the session. When the therapist reflected his feelings back to him, the patient did not respond. Seemed evasive in that regard, but was cooperative with the assessment questions. It appeared he was contemplating leaving AMA. He stated, \"This is day three for me,\" suggesting he believed the rest of his detox could be completed at home. Motivation seemed poor, as did his insight. He seemed overconfident, stating he was done with drug use.  He also seemed to be experiencing a large amount of shame and self-loathing related to his drug use.     P: Patient has been placed on a detox regimen.  He will be monitored routinely for his safety.  He will be provided with individual and group therapy.  He will follow-up with Narcotics Anonymous meetings.  He declined all other forms of aftercare.  Therapist will provide the contact information for the local [facility] mental health center in his area.  "

## 2021-03-18 NOTE — PLAN OF CARE
Goal Outcome Evaluation:  Plan of Care Reviewed With: patient  Progress: improving   PTslept well, refused group, and appetite is fair. No prn medications given after night medication pass.

## 2021-03-18 NOTE — DISCHARGE INSTR - APPOINTMENTS
[address] [address]  [phone]  You've declined an appointment to be scheduled on your behalf.  Call for an appointment if you decide to pursue professional help.

## 2021-03-18 NOTE — PLAN OF CARE
Problem: Adult Behavioral Health Plan of Care  Goal: Plan of Care Review  Outcome: Ongoing, Progressing  Flowsheets (Taken 3/18/2021 1811)  Progress: no change  Plan of Care Reviewed With: patient  Patient Agreement with Plan of Care: agrees  Outcome Summary: pt signing out ama   Goal Outcome Evaluation:  Plan of Care Reviewed With: patient  Progress: no change  Outcome Summary: pt signing out ama

## 2021-03-18 NOTE — DISCHARGE SUMMARY
":  1980  MRN:  [id number]  Visit Number:  [id number]      Date of Admission:3/17/2021   Date of Discharge:  3/18/2021    Discharge Diagnosis:  Active Problems:    Opioid use disorder, severe, dependence (CMS/HCC)        Admission Diagnosis:  Opiate dependence (CMS/HCC) [F11.20]     HPI  Patient is a 40 y.o. male who came to the hospital seeking detox from heroin. For details see H&P dated 3/18/21.      Hospital Course  Patient is a 40 y.o. male presented with heroin use and some withdrawals and sought detox treatment but after he came to the detox unit he decided he didn't need the detox and wanted to leave. Patient reported he was feeling better and it had been three days since he used last and was only experiencing some leg cramps and anxiety and wanted to go home. He was encouraged to stay and complete the detox and then go to rehab but he was adamant on leaving and was discharged AMA.      Mental Status Exam upon discharge:   Mood \"anxious\"   Affect-congruent, appropriate, stable  Thought Content-goal directed, no delusional material present  Thought process-linear, organized.  Suicidality: No SI  Homicidality: No HI  Perception: No AH/VH    Procedures Performed         Consults:   Consults     No orders found for last 30 day(s).          Pertinent Test Results: Glucose 125, Alk phos 262, ALT 93, AST 58. UDS positive for benzodiazepine, buprenorphine, opiate.     Condition on Discharge:  guarded    Vital Signs  Temp:  [98 °F (36.7 °C)-100 °F (37.8 °C)] 98.8 °F (37.1 °C)  Heart Rate:  [71-84] 82  Resp:  [18] 18  BP: (103-152)/(52-92) 130/71      Discharge Disposition:  Left Against Medical Advice    Discharge Medications:     Discharge Medications      Continue These Medications      Instructions Start Date   levothyroxine 112 MCG tablet  Commonly known as: SYNTHROID, LEVOTHROID   112 mcg, Oral, Daily             Discharge Diet: Regular     Activity at Discharge: As tolerated     Follow-up " Appointments  No future appointments.      Test Results Pending at Discharge        Clinician:   Emma Parra MD  03/18/21  13:35 EDT

## 2021-03-18 NOTE — H&P
INITIAL PSYCHIATRIC HISTORY & PHYSICAL    Patient Identification:  Name:  Geoff Lea  Age:  40 y.o.  Sex:  male  :  1980  MRN:  [id number]   Visit Number:  [id number]  Primary Care Physician:  Provider, No Known    SUBJECTIVE    CC/Focus of Exam: heroin use    HPI: Geoff Lea is a 40 y.o. male who was admitted on 3/17/2021 with complaints of heroin use and withdrawals. The patient reports a long history of substance use. First use was 3 years ago. Over time the use increased and the patient  continued to use despite negative consequences. The patient endorses symptoms of tolerance and withdrawals. Has tried to cut down and stop but has not been successful. Spends too much time and resources in pursuit of substance use. Longest period of sobriety is reported to be months at a time.  Currently using about a gram daily intranasally  Last use 3 days ago  Withdrawal symptoms leg cramps    PAST PSYCHIATRIC HX: None reported    SUBSTANCE USE HX: See HPI. Smokes 1 ppd of cigarettes    SOCIAL HX:   Living situation: Lives with his wife in [place]   Employment: [employer]  Education: HS grad, couple of college courses  Sexual orientation: Heterosexual  Marital Status:  for last nine years  Children: One step son  Legal History: denies   Trauma History: denies    History:  denies  Mosque: Pent[employer]al  Access to firearms: denies      Past Medical History:   Diagnosis Date   • Hyperthyroidism    • Kidney stones           Past Surgical History:   Procedure Laterality Date   • APPENDECTOMY     • KIDNEY STONE SURGERY         Family History   Problem Relation Age of Onset   • Drug abuse Brother          Medications Prior to Admission   Medication Sig Dispense Refill Last Dose   • levothyroxine (SYNTHROID, LEVOTHROID) 112 MCG tablet Take 112 mcg by mouth Daily.   3/17/2021 at 0800         ALLERGIES:  Patient has no known allergies.    Temp:  [98 °F (36.7 °C)-100 °F (37.8 °C)] 98.8 °F  (37.1 °C)  Heart Rate:  [71-84] 82  Resp:  [18] 18  BP: (103-152)/(52-92) 130/71    REVIEW OF SYSTEMS:  Review of Systems   Constitutional: Negative.    HENT: Negative.    Eyes: Negative.    Respiratory: Negative.    Cardiovascular: Negative.    Gastrointestinal: Negative.    Endocrine: Negative.    Genitourinary: Negative.    Musculoskeletal: Negative.    Skin: Negative.    Allergic/Immunologic: Negative.    Neurological: Negative.    Hematological: Negative.    Psychiatric/Behavioral: The patient is nervous/anxious.         OBJECTIVE    PHYSICAL EXAM:  Physical Exam  Constitutional:  Appears well-developed and well-nourished.   HENT:   Head: Normocephalic and atraumatic.   Right Ear: External ear normal.   Left Ear: External ear normal.   Mouth/Throat: Oropharynx is clear and moist.   Eyes: Pupils are equal, round, and reactive to light. Conjunctivae and EOM are normal.   Neck: Normal range of motion. Neck supple.   Cardiovascular: Normal rate, regular rhythm and normal heart sounds.    Pulmonary/Chest: Effort normal and breath sounds normal. No respiratory distress. No wheezes.   Abdominal: Soft. Bowel sounds are normal.No distension. There is no tenderness.   Musculoskeletal: Normal range of motion. No edema or deformity.   Neurological:No cranial nerve deficit. Coordination normal.   Skin: Skin is warm and dry. No rash noted. No erythema.       MENTAL STATUS EXAM:    Hygiene:   fair  Cooperation:  Cooperative  Eye Contact:  Fair  Psychomotor Behavior:  Restless  Affect:  Restricted  Hopelessness: Denies  Speech:  Normal  Thought Progress:  Goal directed  Thought Content:  Normal  Suicidal:  None  Homicidal:  None  Hallucinations:  None  Delusion:  None  Memory:  Intact  Orientation:  Person, Place, Time and Situation  Reliability:  fair  Insight:  Fair  Judgement:  Fair  Impulse Control:  Fair      Imaging Results (Last 24 Hours)     ** No results found for the last 24 hours. **           ECG/EMG Results (most  recent)     Procedure Component Value Units Date/Time    ECG 12 Lead [[id number]] Collected: 03/17/21 1522     Updated: 03/17/21 1528     QT Interval 404 ms      QTC Interval 448 ms     Narrative:      Test Reason : Baseline Cardiac Status  Blood Pressure : **/** mmHG  Vent. Rate : 074 BPM     Atrial Rate : 074 BPM     P-R Int : 132 ms          QRS Dur : 090 ms      QT Int : 404 ms       P-R-T Axes : 115 099 151 degrees     QTc Int : 448 ms    ** Suspect arm lead reversal, interpretation assumes no reversal  Normal sinus rhythm  Rightward axis  Pulmonary disease pattern  Abnormal ECG  No previous ECGs available    Referred By:             Confirmed By:            Lab Results   Component Value Date    GLUCOSE 125 (H) 03/17/2021    BUN 13 03/17/2021    CREATININE 0.96 03/17/2021    EGFRIFNONA 88 03/17/2021    BCR 13.5 03/17/2021    CO2 27.7 03/17/2021    CALCIUM 9.3 03/17/2021    ALBUMIN 4.14 03/17/2021    AST 58 (H) 03/17/2021    ALT 93 (H) 03/17/2021       Lab Results   Component Value Date    WBC 8.52 03/17/2021    HGB 15.8 03/17/2021    HCT 49.1 03/17/2021    MCV 93.0 03/17/2021     03/17/2021       Last Urine Toxicity     LAST URINE TOXICITY RESULTS Latest Ref Rng & Units 3/17/2021    AMPHETAMINES SCREEN, URINE Negative Negative    BARBITURATES SCREEN Negative Negative    BENZODIAZEPINE SCREEN, URINE Negative Positive(A)    BUPRENORPHINEUR Negative Positive(A)    COCAINE SCREEN, URINE Negative Negative    METHADONE SCREEN, URINE Negative Negative          Brief Urine Lab Results  (Last result in the past 365 days)      Color   Clarity   Blood   Leuk Est   Nitrite   Protein   CREAT   Urine HCG        03/17/21 1043 Yellow Cloudy Negative Negative Negative Negative               DATA  Labs reviewed. Glucose 125, Alk phos 262, ALT 93, AST 58. UDS positive for benzodiazepine, buprenorphine, opiate.   EKG reviewed. QTc 448.   SILVIA reviewed.   Record reviewed. The patient was seen in the ED on 1/1/21 for  heroin use but he signed out AMA.     Strengths: Literate and Employed    Weaknesses:Substance use and Poor coping skills    Code status:  Full  Discussed code status with patient.    ASSESSMENT & PLAN:        Opioid use disorder, severe, dependence (CMS/HCC)        The patient has been admitted for safety and stabilization.  Patient reports he is feeling better and it has been three days since he used last and is only experiencing some leg cramps and anxiety and wants to go home. He is encouraged to stay and complete the detox and then go to rehab but he is adamant on leaving and will be discharged AMA.

## 2021-03-19 LAB
QT INTERVAL: 404 MS
QTC INTERVAL: 448 MS

## 2021-03-19 NOTE — NURSING NOTE
"Pt reports to this RN with request to leave AMA. Pt sts \"I feel fine. I just want to finish this process at home. I hadn't really been using that much so I don't need to be in a hospital to detox.\" Pt educated on benefits of completing admission and risks of leaving AMA. Pt refuses to complete admission. Pt will be discharged AMA.     Vitals:  Temp: 98.1  Blood pressure: 139/73  Heartrate: 86  Respirations: 18  Oxygen: 96% On room air  "